# Patient Record
Sex: FEMALE | Race: WHITE | Employment: UNEMPLOYED | URBAN - METROPOLITAN AREA
[De-identification: names, ages, dates, MRNs, and addresses within clinical notes are randomized per-mention and may not be internally consistent; named-entity substitution may affect disease eponyms.]

---

## 2018-11-24 ENCOUNTER — HOSPITAL ENCOUNTER (OUTPATIENT)
Age: 23
Setting detail: SPECIMEN
Discharge: HOME | End: 2018-11-24
Payer: COMMERCIAL

## 2018-11-24 PROCEDURE — 87077 CULTURE AEROBIC IDENTIFY: CPT

## 2018-11-24 PROCEDURE — 87491 CHLMYD TRACH DNA AMP PROBE: CPT

## 2018-11-24 PROCEDURE — 87086 URINE CULTURE/COLONY COUNT: CPT

## 2018-11-24 PROCEDURE — 87661 TRICHOMONAS VAGINALIS AMPLIF: CPT

## 2018-11-24 PROCEDURE — 87591 N.GONORRHOEAE DNA AMP PROB: CPT

## 2018-11-24 PROCEDURE — 87186 SC STD MICRODIL/AGAR DIL: CPT

## 2018-11-28 LAB
CULTURE: NORMAL
Lab: NORMAL
ORGANISM: NORMAL
REPORT STATUS: NORMAL
SPECIMEN: NORMAL
TOTAL COLONY COUNT: NORMAL

## 2018-11-29 LAB
CHLAMYDIA TRACHOMATIS AMPLIFIED DET: NEGATIVE
N GONORRHOEAE AMPLIFIED DET: NEGATIVE
T. VAGINALIS BY TMA: NEGATIVE

## 2019-02-05 ENCOUNTER — OFFICE VISIT (OUTPATIENT)
Dept: INTERNAL MEDICINE CLINIC | Age: 24
End: 2019-02-05
Payer: COMMERCIAL

## 2019-02-05 VITALS
BODY MASS INDEX: 22.88 KG/M2 | DIASTOLIC BLOOD PRESSURE: 70 MMHG | HEART RATE: 100 BPM | HEIGHT: 67 IN | SYSTOLIC BLOOD PRESSURE: 100 MMHG | OXYGEN SATURATION: 98 % | RESPIRATION RATE: 20 BRPM | WEIGHT: 145.8 LBS

## 2019-02-05 DIAGNOSIS — Z20.2 POSSIBLE EXPOSURE TO STD: ICD-10-CM

## 2019-02-05 DIAGNOSIS — B86 SCABIES: ICD-10-CM

## 2019-02-05 DIAGNOSIS — Z76.89 ENCOUNTER TO ESTABLISH CARE: Primary | ICD-10-CM

## 2019-02-05 DIAGNOSIS — Z86.19 HX OF HEPATITIS C: ICD-10-CM

## 2019-02-05 DIAGNOSIS — Z11.4 ENCOUNTER FOR SCREENING FOR HIV: ICD-10-CM

## 2019-02-05 DIAGNOSIS — Z13.31 POSITIVE DEPRESSION SCREENING: ICD-10-CM

## 2019-02-05 DIAGNOSIS — F41.8 DEPRESSION WITH ANXIETY: ICD-10-CM

## 2019-02-05 PROCEDURE — G8484 FLU IMMUNIZE NO ADMIN: HCPCS | Performed by: NURSE PRACTITIONER

## 2019-02-05 PROCEDURE — G8427 DOCREV CUR MEDS BY ELIG CLIN: HCPCS | Performed by: NURSE PRACTITIONER

## 2019-02-05 PROCEDURE — G8431 POS CLIN DEPRES SCRN F/U DOC: HCPCS | Performed by: NURSE PRACTITIONER

## 2019-02-05 PROCEDURE — G8420 CALC BMI NORM PARAMETERS: HCPCS | Performed by: NURSE PRACTITIONER

## 2019-02-05 PROCEDURE — 96160 PT-FOCUSED HLTH RISK ASSMT: CPT | Performed by: NURSE PRACTITIONER

## 2019-02-05 PROCEDURE — 99204 OFFICE O/P NEW MOD 45 MIN: CPT | Performed by: NURSE PRACTITIONER

## 2019-02-05 PROCEDURE — 4004F PT TOBACCO SCREEN RCVD TLK: CPT | Performed by: NURSE PRACTITIONER

## 2019-02-05 RX ORDER — PERMETHRIN 50 MG/G
CREAM TOPICAL
Qty: 1 TUBE | Refills: 0 | Status: SHIPPED | OUTPATIENT
Start: 2019-02-05 | End: 2019-10-23

## 2019-02-05 RX ORDER — LEVONORGESTREL AND ETHINYL ESTRADIOL 0.1-0.02MG
KIT ORAL
Refills: 4 | COMMUNITY
Start: 2019-01-25 | End: 2019-10-23

## 2019-02-05 RX ORDER — PAROXETINE HYDROCHLORIDE 20 MG/1
20 TABLET, FILM COATED ORAL NIGHTLY
Qty: 30 TABLET | Refills: 0 | Status: SHIPPED | OUTPATIENT
Start: 2019-02-05 | End: 2019-02-05 | Stop reason: CLARIF

## 2019-02-05 RX ORDER — PAROXETINE HYDROCHLORIDE 20 MG/1
20 TABLET, FILM COATED ORAL DAILY
Qty: 30 TABLET | Refills: 2 | Status: SHIPPED | OUTPATIENT
Start: 2019-02-05 | End: 2019-10-23

## 2019-02-05 RX ORDER — BUSPIRONE HYDROCHLORIDE 5 MG/1
5 TABLET ORAL 2 TIMES DAILY
Qty: 60 TABLET | Refills: 0 | Status: CANCELLED | OUTPATIENT
Start: 2019-02-05 | End: 2019-03-07

## 2019-02-05 RX ORDER — CITALOPRAM 10 MG/1
10 TABLET ORAL DAILY
Qty: 30 TABLET | Refills: 3 | Status: CANCELLED | OUTPATIENT
Start: 2019-02-05

## 2019-02-05 ASSESSMENT — ENCOUNTER SYMPTOMS
SINUS PAIN: 0
COUGH: 0
ALLERGIC/IMMUNOLOGIC NEGATIVE: 1
NAUSEA: 0
COLOR CHANGE: 0
DIARRHEA: 0
SHORTNESS OF BREATH: 0
APNEA: 0
ABDOMINAL PAIN: 0
CHEST TIGHTNESS: 0
EYES NEGATIVE: 1
SINUS PRESSURE: 0
VOMITING: 0

## 2019-02-05 ASSESSMENT — PATIENT HEALTH QUESTIONNAIRE - PHQ9
7. TROUBLE CONCENTRATING ON THINGS, SUCH AS READING THE NEWSPAPER OR WATCHING TELEVISION: 2
10. IF YOU CHECKED OFF ANY PROBLEMS, HOW DIFFICULT HAVE THESE PROBLEMS MADE IT FOR YOU TO DO YOUR WORK, TAKE CARE OF THINGS AT HOME, OR GET ALONG WITH OTHER PEOPLE: 3
6. FEELING BAD ABOUT YOURSELF - OR THAT YOU ARE A FAILURE OR HAVE LET YOURSELF OR YOUR FAMILY DOWN: 0
9. THOUGHTS THAT YOU WOULD BE BETTER OFF DEAD, OR OF HURTING YOURSELF: 0
8. MOVING OR SPEAKING SO SLOWLY THAT OTHER PEOPLE COULD HAVE NOTICED. OR THE OPPOSITE, BEING SO FIGETY OR RESTLESS THAT YOU HAVE BEEN MOVING AROUND A LOT MORE THAN USUAL: 3
SUM OF ALL RESPONSES TO PHQ QUESTIONS 1-9: 16
SUM OF ALL RESPONSES TO PHQ QUESTIONS 1-9: 16
2. FEELING DOWN, DEPRESSED OR HOPELESS: 2
4. FEELING TIRED OR HAVING LITTLE ENERGY: 1
SUM OF ALL RESPONSES TO PHQ9 QUESTIONS 1 & 2: 4
3. TROUBLE FALLING OR STAYING ASLEEP: 3
1. LITTLE INTEREST OR PLEASURE IN DOING THINGS: 2
5. POOR APPETITE OR OVEREATING: 3

## 2019-05-10 ENCOUNTER — HOSPITAL ENCOUNTER (OUTPATIENT)
Dept: PSYCHIATRY | Age: 24
Setting detail: THERAPIES SERIES
Discharge: HOME | End: 2019-05-10
Payer: COMMERCIAL

## 2019-05-10 PROCEDURE — 80305 DRUG TEST PRSMV DIR OPT OBS: CPT

## 2019-05-10 PROCEDURE — 90791 PSYCH DIAGNOSTIC EVALUATION: CPT

## 2019-05-13 ENCOUNTER — APPOINTMENT (OUTPATIENT)
Dept: PSYCHIATRY | Age: 24
End: 2019-05-13
Payer: COMMERCIAL

## 2019-05-15 ENCOUNTER — APPOINTMENT (OUTPATIENT)
Dept: PSYCHIATRY | Age: 24
End: 2019-05-15
Payer: COMMERCIAL

## 2019-05-17 ENCOUNTER — APPOINTMENT (OUTPATIENT)
Dept: PSYCHIATRY | Age: 24
End: 2019-05-17
Payer: COMMERCIAL

## 2019-05-31 ENCOUNTER — APPOINTMENT (OUTPATIENT)
Dept: PSYCHIATRY | Age: 24
End: 2019-05-31
Payer: COMMERCIAL

## 2019-10-23 ENCOUNTER — APPOINTMENT (OUTPATIENT)
Dept: CT IMAGING | Age: 24
End: 2019-10-23
Payer: COMMERCIAL

## 2019-10-23 ENCOUNTER — HOSPITAL ENCOUNTER (EMERGENCY)
Age: 24
Discharge: HOME | End: 2019-10-23
Attending: EMERGENCY MEDICINE
Payer: COMMERCIAL

## 2019-10-23 VITALS
RESPIRATION RATE: 18 BRPM | DIASTOLIC BLOOD PRESSURE: 78 MMHG | BODY MASS INDEX: 28.25 KG/M2 | WEIGHT: 180 LBS | HEART RATE: 55 BPM | HEIGHT: 67 IN | OXYGEN SATURATION: 100 % | SYSTOLIC BLOOD PRESSURE: 111 MMHG | TEMPERATURE: 97.4 F

## 2019-10-23 DIAGNOSIS — R11.2 NON-INTRACTABLE VOMITING WITH NAUSEA, UNSPECIFIED VOMITING TYPE: ICD-10-CM

## 2019-10-23 DIAGNOSIS — G44.89 OTHER HEADACHE SYNDROME: Primary | ICD-10-CM

## 2019-10-23 PROCEDURE — 96375 TX/PRO/DX INJ NEW DRUG ADDON: CPT

## 2019-10-23 PROCEDURE — 2580000003 HC RX 258: Performed by: EMERGENCY MEDICINE

## 2019-10-23 PROCEDURE — 96365 THER/PROPH/DIAG IV INF INIT: CPT

## 2019-10-23 PROCEDURE — 99284 EMERGENCY DEPT VISIT MOD MDM: CPT

## 2019-10-23 PROCEDURE — 2500000003 HC RX 250 WO HCPCS: Performed by: EMERGENCY MEDICINE

## 2019-10-23 PROCEDURE — 6360000002 HC RX W HCPCS: Performed by: EMERGENCY MEDICINE

## 2019-10-23 PROCEDURE — 96361 HYDRATE IV INFUSION ADD-ON: CPT

## 2019-10-23 PROCEDURE — 6370000000 HC RX 637 (ALT 250 FOR IP): Performed by: EMERGENCY MEDICINE

## 2019-10-23 PROCEDURE — 70450 CT HEAD/BRAIN W/O DYE: CPT

## 2019-10-23 RX ORDER — KETOROLAC TROMETHAMINE 30 MG/ML
30 INJECTION, SOLUTION INTRAMUSCULAR; INTRAVENOUS ONCE
Status: COMPLETED | OUTPATIENT
Start: 2019-10-23 | End: 2019-10-23

## 2019-10-23 RX ORDER — ONDANSETRON 2 MG/ML
4 INJECTION INTRAMUSCULAR; INTRAVENOUS EVERY 30 MIN PRN
Status: DISCONTINUED | OUTPATIENT
Start: 2019-10-23 | End: 2019-10-23 | Stop reason: HOSPADM

## 2019-10-23 RX ORDER — KETOROLAC TROMETHAMINE 30 MG/ML
30 INJECTION, SOLUTION INTRAMUSCULAR; INTRAVENOUS ONCE
Status: DISCONTINUED | OUTPATIENT
Start: 2019-10-23 | End: 2019-10-23

## 2019-10-23 RX ORDER — 0.9 % SODIUM CHLORIDE 0.9 %
1000 INTRAVENOUS SOLUTION INTRAVENOUS ONCE
Status: COMPLETED | OUTPATIENT
Start: 2019-10-23 | End: 2019-10-23

## 2019-10-23 RX ORDER — DIPHENHYDRAMINE HCL 25 MG
25 TABLET ORAL ONCE
Status: COMPLETED | OUTPATIENT
Start: 2019-10-23 | End: 2019-10-23

## 2019-10-23 RX ORDER — PROCHLORPERAZINE EDISYLATE 5 MG/ML
10 INJECTION INTRAMUSCULAR; INTRAVENOUS ONCE
Status: COMPLETED | OUTPATIENT
Start: 2019-10-23 | End: 2019-10-23

## 2019-10-23 RX ADMIN — KETOROLAC TROMETHAMINE 30 MG: 30 INJECTION, SOLUTION INTRAMUSCULAR; INTRAVENOUS at 11:55

## 2019-10-23 RX ADMIN — SODIUM CHLORIDE 1000 ML: 9 INJECTION, SOLUTION INTRAVENOUS at 11:01

## 2019-10-23 RX ADMIN — ONDANSETRON 4 MG: 2 INJECTION INTRAMUSCULAR; INTRAVENOUS at 11:01

## 2019-10-23 RX ADMIN — PROCHLORPERAZINE EDISYLATE 10 MG: 5 INJECTION INTRAMUSCULAR; INTRAVENOUS at 11:01

## 2019-10-23 RX ADMIN — DIPHENHYDRAMINE HYDROCHLORIDE 25 MG: 25 TABLET ORAL at 11:01

## 2019-10-23 RX ADMIN — CAFFEINE AND SODIUM BENZOATE 500 MG: 125 INJECTION, SOLUTION INTRAMUSCULAR; INTRAVENOUS at 11:30

## 2019-10-23 ASSESSMENT — PAIN DESCRIPTION - LOCATION: LOCATION: HEAD

## 2019-10-23 ASSESSMENT — ENCOUNTER SYMPTOMS
SORE THROAT: 0
EYE REDNESS: 0
SHORTNESS OF BREATH: 0
VOMITING: 1
NAUSEA: 1
BACK PAIN: 0
RHINORRHEA: 0
CONSTIPATION: 0
DIARRHEA: 0
PHOTOPHOBIA: 1
COUGH: 0
ABDOMINAL PAIN: 0

## 2019-10-23 ASSESSMENT — PAIN DESCRIPTION - PAIN TYPE: TYPE: ACUTE PAIN

## 2019-10-23 ASSESSMENT — PAIN SCALES - GENERAL
PAINLEVEL_OUTOF10: 7
PAINLEVEL_OUTOF10: 7

## 2020-12-14 ENCOUNTER — HOSPITAL ENCOUNTER (OUTPATIENT)
Dept: PSYCHIATRY | Age: 25
Setting detail: THERAPIES SERIES
Discharge: HOME | End: 2020-12-14
Payer: COMMERCIAL

## 2020-12-14 PROCEDURE — 90791 PSYCH DIAGNOSTIC EVALUATION: CPT

## 2020-12-14 PROCEDURE — 80305 DRUG TEST PRSMV DIR OPT OBS: CPT

## 2020-12-14 ASSESSMENT — LIFESTYLE VARIABLES: HISTORY_ALCOHOL_USE: YES

## 2020-12-14 NOTE — PROGRESS NOTES
[address] [facility]        Individual  Progress Note    Location: [x] [place] [] Elisabeth cadet                   Patient Name: Johnnie Owen   : 1995     Case # :  [id number]  Therapist: Jorge Torrez        Objective/Service/Time: K 1 assessment    S-Client is a 31year old  female on probation with [address] [place] [address] after getting a drug TONY in 2020. Client report she was using cocaine but it was laced with Fentanyl and the police gave her Narcan. Client has been to multiple treatment centers both inpatient and outpatient. O-Client was cooperative, her thought process was logical, her mood euthymic at times depressed, her affect full and speech rapid. Client denies any hallucinations or delusions at this time. No HI/SI. A-Completed intake paperwork, began assessment and administered initial UDS. Client was placed in Level 2.1 IOP to begin 2020. She will attend Monday, Wednesday and Friday 9:00am to 12:00PM.     P-Client will start group ASAP on 2020 and will return on 20 at 8:00am to complete her assessment.          Electronically signed by Jorge Torrez on 2020 at 10:56 AM

## 2020-12-16 ENCOUNTER — HOSPITAL ENCOUNTER (OUTPATIENT)
Dept: PSYCHIATRY | Age: 25
Setting detail: THERAPIES SERIES
Discharge: HOME | End: 2020-12-16
Payer: COMMERCIAL

## 2020-12-16 NOTE — GROUP NOTE
[facility] Group Therapy Note      12/16/2020    Location:  [place]      Clients Presents: [phone]    Clients Absent: [phone]    Length of session: 3.0 hours    Group Note: IOP    Group Type: Co-Ed    New members were welcomed and introduced. Norms and expectations of group were discussed. Content: Checked-In  TOPIC:  \"Spirituality and Personality\" Part II  Clients learned to understand some of the ways in which personality change occurs and what they can do to make positive personality changes and grow spiritually.     [facility] 320  44/24/3775 7:50 PM          [employer] Individual Group Progress Note    Fallon Bermudez  1995 12/16/2020    Notes on Client Progress in Group    Reason for Absence: CX-Transportation fail through    [facility] 320  49/32/1442 9:94 PM    Co-Therapist: N/A

## 2020-12-18 ENCOUNTER — HOSPITAL ENCOUNTER (OUTPATIENT)
Dept: PSYCHIATRY | Age: 25
Setting detail: THERAPIES SERIES
Discharge: HOME | End: 2020-12-18
Payer: COMMERCIAL

## 2020-12-18 PROCEDURE — 90853 GROUP PSYCHOTHERAPY: CPT

## 2020-12-18 NOTE — GROUP NOTE
[facility] Group Therapy Note      12/18/2020    Location:  [facility] Presents: [id number] [id number] [phone]    Clients Absent:     Length of session: 3.0 hours    Group Note: IOP    Group Type: Co-Ed    New members were welcomed and introduced. Norms and expectations of group were discussed. Content: Group Checked-In  TOPIC:  \"Difficulties of the Holidays\"  Clients identified the following difficulties of the holidays:  Isolation, Triggers, Themselves, and Lack of Support. They verbalized strategies to handle difficulties that will support their own sobriety (i.e. don't be alone, identify triggers, ride out cravings, do something different, distract yourself, contact sponsor/support people, attend a meeting/Zoom/Face-Book, take a walk etc.). Tresa Alford [facility] 320  56/46/0120 6:75 PM          [employer] Individual Group Progress Note    Milena Laughter  1995 12/18/2020    Notes on Client Progress in Group    This was Jak's first IOP group. She stated, \"I feel content. \" \"I have defeated temptation consistently all week! \"  Participated in the group discussion and shared openly. Gave appropriate feedback.     Tresa Alford [facility] 320  20/03/4292 2:14 PM    Co-Therapist: N/A

## 2020-12-21 ENCOUNTER — APPOINTMENT (OUTPATIENT)
Dept: PSYCHIATRY | Age: 25
End: 2020-12-21
Payer: COMMERCIAL

## 2020-12-21 ENCOUNTER — HOSPITAL ENCOUNTER (OUTPATIENT)
Dept: PSYCHIATRY | Age: 25
Setting detail: THERAPIES SERIES
Discharge: HOME | End: 2020-12-21
Payer: COMMERCIAL

## 2020-12-21 PROCEDURE — 90853 GROUP PSYCHOTHERAPY: CPT

## 2020-12-21 PROCEDURE — 90834 PSYTX W PT 45 MINUTES: CPT

## 2020-12-21 NOTE — PROGRESS NOTES
[facility] TREATMENT PLAN      Location: [x] [place] [] Lesly Small    Treatment plan: Initial    Strengths: taking online college classes    Weakness/Limitations: tendency to isolate     Service/Frequency/Duration: IOP 3 days a week for 90 days, Urinalysis Random for 90 days, AA/NA 1-2 weekly for 90 days and Case Management as needed for 90 days    Diagnosis: F10.20 Other and unspecified alcohol dependence/unspecified drinking behavior, F11.20 Opioid dependence-unspecified use, F14.20 Cocaine dependence-unspecified use, F15.20 Amphetamine and other psychostimulant dependence-unspecified use and F12.10 Nondependent cannabis abuse-unspecified use    Level of Care: 2.1 Intensive Outpatient Services    1. Problem: Substance use   a. Goal: Client will remain abstinent from all mood altering substances in 90 days   b. Objectives:   i. 1) Client will acknowledge 8 old people, places, and things that are unhealthy to her recovery journey to share weekly during group sessions Evaluation Date: 3/21/2021 Code: C Continue TBD  ii. 2) Client will identify 10 problems caused by AoD use and led to consequences and be able to process with counselor weekly Evaluation Date: 3/21/2021 Code: C Continue TBD  3) Client will identify environments that are detrimental to her recovery and develop ways to avoid them during individual sessions one time each week with primary counselor Evaluation Date: 3/21/2021 Code: C Continue TBD    2. Problem: Legal involvement   a. Goal: Client will comply with court/probation stipulation requirements in 90 days. b. Objectives:   i. 1) Client will participate in updated progress reports for legal system. Evaluation Date: 3/21/2021 Code: C Continue TBD   ii.  2) Client will submit to any random UDS and pay on any court/probation fines   Evaluation Date: 3/21/2021 Code: C Continue TBD 3) Client will meet with  for linkage and referral to community resources as needed. Evaluation Date: 3/21/2021 Code: C Continue TBD     3. Problem: Relapse prevention   a. Goal: Client will learn new relapse prevention skills to avoid relapsing in the future in 90 days  b. Objectives:   i. 1)  Client will complete a relapse prevention workbook and develop a relapse prevention plan and process with his counselor once a week. Evaluation Date: 3/21/2021 Code: C Continue TBD   ii. 2)  Client will attend 1-2 NA/AA meetings weekly for building support. Evaluation Date: 3/21/2021 Code: C Continue TBD   iii 3) Client will obtain 3-5 phone numbers of sober support people and reach out weekly Evaluation Date: 3/21/2021 Code: C Continue TBD    Defer: Client would like to get 6 months clean and get her NA tag    Discharge Plan/Instructions: Client will complete her goals and objectives and maintain abstinence as evidence by UDS. Ana Cristina Gaspar / 1995 has participated in the treatment plan development outlined above on 12/21/2020.      Dmitriy Charles, [address]  12/21/2020/8:35 AM

## 2020-12-21 NOTE — PROGRESS NOTES
612 [facility]        Individual  Progress Note    Location: [x] [place] [] Elisabeth cadet                   Patient Name: Courtney Lino   : 1995     Case # :  [id number]  Therapist: Dennie Cotta        Objective/Service/Time: K 1 individual    S-Client is a 31year old  female on probation. Client reports she got an [address] in 2020 (drug) Client is on probation with [employer]. Client reports she uses cocaine. During her TONY she told officers she snorted 20 of cocaine but as soon as she did it she knew it was laced with Fentanyl and they gave her Narcan. O-Client was cooperative, pleasant and oriented x 4. A-Client and counselor reviewed last UDS dated 2020 that was negative for all substances. Completed assessment and created a treatment plan. Client placed in [facility] and has engaged in group.     P-Client will attend IOP Monday, Wednesday and Friday 9am-12pm.        Electronically signed by Dennie Cotta on 2020 at 9:04 AM

## 2020-12-21 NOTE — GROUP NOTE
[facility] Group Therapy Note      12/21/2020    Location:  [place]      Clients Presents: [phone] [phone]    Clients Absent: [phone]    Length of session: 3.0 hours    Group Note: IOP    Group Type: Co-Ed    New members were welcomed and introduced. Norms and expectations of group were discussed. Content: Group Completed Checked-In  TOPIC:  \"Yes, I'm Still Clean\"  Clients viewed  \"Dakotah Talley\" video who shared his personal story of addiction. Clients shared what they could relate to, what they took from [place] Kailyn' story, and how they plan to maintain their own sobriety. Tresa Alford [facility] 320  38/43/1315 9:66 PM        [employer] Individual Group Progress Note    Wagner Sampson  1995 12/21/2020    Notes on Client Progress in Group    Vernellraz Sky was attentive. She stated, \"I feel amazing! \" \"Internally it's different for me this time! \"  Participated in the viewing of the video. Shared what she could relate to and offered insight into her own struggle with substances. Gave appropriate feedback.       Tresa Alford [facility] 320  10/99/9540 3:47 PM    Co-Therapist: N/A

## 2020-12-23 ENCOUNTER — HOSPITAL ENCOUNTER (OUTPATIENT)
Dept: PSYCHIATRY | Age: 25
Setting detail: THERAPIES SERIES
Discharge: HOME | End: 2020-12-23
Payer: COMMERCIAL

## 2020-12-23 PROCEDURE — 90853 GROUP PSYCHOTHERAPY: CPT

## 2020-12-23 NOTE — PROGRESS NOTES
[facility]        Individual  Progress Note    Location: [x] [place] [] [place]                   Patient Name: Jason Blood   : 1995     Case # :  [id number]  Therapist: Madeleine Hendrickson        Objective/Service/Time:       CASE MANAGEMENT    S:  I spoke with client in office about her concerns with needing a refill on her medication at [facility]. Client signed a release. She explained that she has left a message, but has not received a return call. O:  Client was pleasant open to discussion. A:  I contacted Hunter Trent at [address] [facility]. I explained that client sees, Lou Laguna (psychiatrist, Roya Valencia (physician) and María Cruz (). Connor Cedeño contacted necessary [address]  staff to resolve the issue. Clients medication was sent to her pharmacy. P:  Client will continue to speak with Therapist or  about requests for assistance if needed.       JEROME Emanuel, [address], CTTS        Electronically signed by Madeleine Hendrickson on 2020 at 3:17 PM

## 2020-12-23 NOTE — GROUP NOTE
612 [facility] Group Therapy Note      12/23/2020    Location:  [employer]    Clients Presents: [phone], [address], [phone]    Clients Absent: [phone]    Length of session: 3.0 hours    Group Note: IOP    Group Type: Co-Ed    New members were welcomed and introduced. Norms and expectations of group were discussed. Content: Counselor facilitated client check in information. Counselor presented a solution focused discussion on relapse prevention. Client identified stressors he/she may face during early recovery. Client identified coping skills he/she could utilize to avoid relapse. Client offered peers feedback and support. Nancy Blanco, [employer] [facility]  12/23/2020 12:00 PM    Co-Therapist: N/A      [facility] Individual Group Progress Note    Milena Delatorre  1995 12/23/2020    Notes on Client Progress in Group    Client shared her check in information reporting she felt \"optimistic and excited\". She shared she is still reaching out to people she should not be. She is excited for her father to be coming in town. He has not seen her clean in a while. Client denies nay use but shared she has cravings all the time. She is utilizing her sober support and using her coping skills. She has many admitted challenges with old people and staying off social media. Client participated in group discussion by sharing she is going to enjoy [place] with family because she was able to get gifts this year and shared she felt good about it.       Nancy Blanco, [employer] [facility]  12/23/2020 12:43 PM    Co-Therapist: N/A

## 2020-12-28 ENCOUNTER — APPOINTMENT (OUTPATIENT)
Dept: PSYCHIATRY | Age: 25
End: 2020-12-28
Payer: COMMERCIAL

## 2020-12-30 ENCOUNTER — HOSPITAL ENCOUNTER (OUTPATIENT)
Dept: PSYCHIATRY | Age: 25
Setting detail: THERAPIES SERIES
Discharge: HOME | End: 2020-12-30
Payer: COMMERCIAL

## 2020-12-30 PROCEDURE — 80305 DRUG TEST PRSMV DIR OPT OBS: CPT

## 2020-12-30 PROCEDURE — 90853 GROUP PSYCHOTHERAPY: CPT

## 2020-12-30 NOTE — GROUP NOTE
612  Group Therapy Note      12/30/2020    Location:  [employer]    Clients Presents: [phone]    Clients Absent: 9905, 9917    Length of session: 1.5 hours    Group Note: OP    Group Type: Co-Ed    New members were welcomed and introduced. Norms and expectations of group were discussed. Content: Counselor facilitated Client check in information. Counselor presented a topic focused discussion on having fun in sobriety. Client identified different ways to have fun in recovery. Client identified areas of danger when it comes to leisure interest and coping skills to avoid relapse. Client offered peers feedback and support. Rebekah Bermudez, [address]  12/30/2020 12:00 PM    Co-Therapist: N/A      [facility] Individual Group Progress Note    Judd Jo  1995 12/30/2020    Notes on Client Progress in Group    Client shared her check in information reporting no use or cravings. She shared about her Lynn with her father and brother. She reports overcoming a stressful dinner with family and did not use. She did hang out with a judie she knows is probably not good for her. Client is working part time and is working on being compliant with her mental health medication. Counselor observed this client being very scattered. Client reports she went 5 days without her MH medication. Client participated in group discussion and shared she knows how to have fun but not sober. Client is learning how to not act on impulse and think things through. Client reports her impulsivity has gotten her into trouble in the past. She did have some success over the holiday with not acting out in anger with her Uncle.      Rebekah Bermudez, [address]  12/30/2020 12:51 PM    Co-Therapist: N/A

## 2021-01-04 ENCOUNTER — APPOINTMENT (OUTPATIENT)
Dept: PSYCHIATRY | Age: 26
End: 2021-01-04
Payer: COMMERCIAL

## 2021-01-04 ENCOUNTER — HOSPITAL ENCOUNTER (OUTPATIENT)
Dept: PSYCHIATRY | Age: 26
Setting detail: THERAPIES SERIES
Discharge: HOME | End: 2021-01-04
Payer: COMMERCIAL

## 2021-01-04 PROCEDURE — 80305 DRUG TEST PRSMV DIR OPT OBS: CPT

## 2021-01-04 PROCEDURE — 90853 GROUP PSYCHOTHERAPY: CPT

## 2021-01-04 NOTE — GROUP NOTE
[facility] Group Therapy Note      1/4/2021    Location:  [place]      Clients Presents: [phone]    Clients Absent: [phone] [phone]    Length of session: 3.0 hours    Group Note: IOP    Group Type: Co-Ed    New members were welcomed and introduced. Norms and expectations of group were discussed. Content: Group Checked-In  TOPIC:  \"Assessment tool In Measuring client's motivation to Change\" - \"Worksheet Identifying External/Internal Triggers and Management Skills to [employer]"  Clients evaluated their motivation to change using scaling questions. Clients identified externa/internal triggers and strategies to management them that promote their sobriety. Godfrey Alford  9/9/8260 2:42 PM        [employer] Individual Group Progress Note    Sameera Mora  1995 1/4/2021    Notes on Client Progress in Group    Cayden Jean was attentive. She stated, \"I accomplished! \" \"Passed my class! \" \"I had a close call yesterday. \" After sharing about her close call. she was confronted about her thinking and behavior and her part in the situation and became more agitated as the group session continued. Toward the end of the group she owned up to her behavior outside of the group and during the group. Participated in the group discussion, shared openly, and offered insight into what she wants to change, steps to take to change, along with identifying her own triggers and strategies to handle them so that relapse doesn't occur. Gave appropriate feedback.       Godfrey Alford  5/8/1395 4:43 PM    Co-Therapist: N/A

## 2021-01-06 ENCOUNTER — HOSPITAL ENCOUNTER (OUTPATIENT)
Dept: PSYCHIATRY | Age: 26
Setting detail: THERAPIES SERIES
Discharge: HOME | End: 2021-01-06
Payer: COMMERCIAL

## 2021-01-06 PROCEDURE — 90853 GROUP PSYCHOTHERAPY: CPT

## 2021-01-07 ENCOUNTER — HOSPITAL ENCOUNTER (OUTPATIENT)
Dept: PSYCHIATRY | Age: 26
Setting detail: THERAPIES SERIES
Discharge: HOME | End: 2021-01-07
Payer: COMMERCIAL

## 2021-01-07 PROCEDURE — 90834 PSYTX W PT 45 MINUTES: CPT

## 2021-01-07 NOTE — PROGRESS NOTES
[facility]                Progress Note    [x] [place]  [] Elisabeth cadet                    Patient Name: Ivelisse Beauleiu   : 1995     Case # :  [id number]  Therapist: Edy Arceo        Objective/Service/Time:    K.1    S:  Client completed with first individual session with this therapist. She stated, \"I drank 3 glasses of wine yesterday but I didn't use any other hard drugs. \" She was able to process her last lapse and will working on a Personal Relapse Prevention Plan. O:  Client was talkative and cooperative. A:  Client continues in IOP. She has significant mental health concerns, sees a counselor and takes various medications. She is in the action stage of change but continues to struggle with staying clean and sober. She has completed     P:  Continue with treatment.                   Javier Garcia MA, [facility], , 46:04 AM

## 2021-01-08 ENCOUNTER — HOSPITAL ENCOUNTER (OUTPATIENT)
Dept: PSYCHIATRY | Age: 26
Setting detail: THERAPIES SERIES
Discharge: HOME | End: 2021-01-08
Payer: COMMERCIAL

## 2021-01-11 ENCOUNTER — HOSPITAL ENCOUNTER (OUTPATIENT)
Dept: PSYCHIATRY | Age: 26
Setting detail: THERAPIES SERIES
Discharge: HOME | End: 2021-01-11
Payer: COMMERCIAL

## 2021-01-11 ENCOUNTER — APPOINTMENT (OUTPATIENT)
Dept: PSYCHIATRY | Age: 26
End: 2021-01-11
Payer: COMMERCIAL

## 2021-01-11 NOTE — GROUP NOTE
[facility] Group Therapy Note      1/11/2021    Location:  [place]      Clients Presents: [phone]    Clients Absent: 9915    Length of session: 3.0 hours    Group Note: IOP    Group Type: Co-Ed    New members were welcomed and introduced. Norms and expectations of group were discussed. Content: Group Checked-In  TOPIC:  \"Motivation in Recovery\" - \"10 Ways To Find New Motivation and Rise Above Roadblocks\"  Clients watched DVD and listened to how other recovering people stayed motivated in their recovery. They also read and learned about:  \"Motivation Roadblocks in Recovery and How to Get and Stay Motivated in Addiction Recovery. Godfrey Alford  5/19/7021 33:17 PM        [facility] Individual Group Progress Note    Nellie Garcia  1995 1/11/2021    Notes on Client Progress in Group    Reason for Absence: CX-Client reports having a swollen face due to a toothache and will bring documentation at her next session.     Godfrey Alford  7/29/4925 11:48 PM    Co-Therapist: N/A

## 2021-01-13 ENCOUNTER — APPOINTMENT (OUTPATIENT)
Dept: PSYCHIATRY | Age: 26
End: 2021-01-13
Payer: COMMERCIAL

## 2021-01-13 ENCOUNTER — HOSPITAL ENCOUNTER (OUTPATIENT)
Dept: PSYCHIATRY | Age: 26
Setting detail: THERAPIES SERIES
Discharge: HOME | End: 2021-01-13
Payer: COMMERCIAL

## 2021-01-13 PROCEDURE — 90853 GROUP PSYCHOTHERAPY: CPT

## 2021-01-13 NOTE — GROUP NOTE
[facility] Group Therapy Note      1/13/2021    Location:  [place]      Clients Presents: [phone]    Clients Absent: 0    Length of session: 3.0 hours    Group Note: IOP    Group Type: Co-Ed    New members were welcomed and introduced. Norms and expectations of group were discussed. Content: Group Checked-In  TOPIC:  \"Adverse Childhood Experiences (ACE) Assessment and Discussion\"   Clients examined the relationship between ACE's to substance use and related behavioral health problems such as:  Early initiation of alcohol use, Higher risk of mental and substance use disorders as an older adult (50+ years), Continued tobacco use during adulthood, Prescription drug use, and Lifetime of illicit drug use. Clients discussed their scores, the ramifications of ACE in their own lives and the lives of their own children. Tresa Alford [facility] 320  7/33/0170 4:56 PM          [employer] Individual Group Progress Note    Stan Hidalgo  1995 1/13/2021    Notes on Client Progress in Group    Scott Wolf was attentive. She stated, \"I feel exhausted. \" \"I fell for it again! \" She went into details about an unhealthy relationship that she is in, her part in it, and what she is willing to do differently. Participated in the group discussion, completed ACE assessment, and shared openly about her concerns about her own child being exposed to experiences and how that made her feel. She processed her feelings and received mutual support from other group members. Other protective factors and strategies were discussed to reduce the transmission of ACE's. Gave appropriate feedback.        Tresa Alford [facility] 320  6/99/5694 8:14 PM    Co-Therapist: N/A

## 2021-01-15 ENCOUNTER — HOSPITAL ENCOUNTER (OUTPATIENT)
Dept: PSYCHIATRY | Age: 26
Setting detail: THERAPIES SERIES
Discharge: HOME | End: 2021-01-15
Payer: COMMERCIAL

## 2021-01-15 PROCEDURE — H2020 THER BEHAV SVC, PER DIEM: HCPCS

## 2021-01-15 NOTE — GROUP NOTE
[facility] Group Therapy Note      1/15/2021    Location:  [place]      Clients Presents: [phone]    Clients Absent: 7223    Length of session: 3.0 hours    Group Note: [facility]    Group Type: Co-Ed    New members were welcomed and introduced. Norms and expectations of group were discussed. Content: Group Checked-In  TOPIC:  \"Coping With the Possibility of a Relapse\" - \"Managing Anger\"  Clients explored feelings and needs of another with respect to relapse and learned strategies for coping with relapse. Clients played the Empressr" where they learned how anger could be a trigger. They discussed ways to recognize anger, how to address buildup anger, and strategies to handle anger that support their own recovery. Tresa Matta [facility] 320  6/03/8192 15:74 PM          [facility] Progress [facility]    Bull Soriano  1995  1/15/2021    Notes on Client Progress in Group    Anthony Isbell was all over the place today in group with her feelings. She stated, \"I use  F- - - -! \"I don't know what's wrong with me! \" She was able to process her relapse, received encouragement from other group members, and made a plan for the weekend to stay sober. Client was open to insight other group members gave and stated, \"I don't want to go to [facility] ! \" Client has an individual session next Tuesday to talk about a HLOC. She identified ways she had handled anger in the past, and other strategies in channeling her anger today. Gave appropriate feedback.       Tresa Matta [facility] 320  4/33/6190 8:86 PM    Co-Therapist: N/A

## 2021-01-18 ENCOUNTER — HOSPITAL ENCOUNTER (OUTPATIENT)
Dept: PSYCHIATRY | Age: 26
Setting detail: THERAPIES SERIES
Discharge: HOME | End: 2021-01-18
Payer: COMMERCIAL

## 2021-01-18 ENCOUNTER — APPOINTMENT (OUTPATIENT)
Dept: PSYCHIATRY | Age: 26
End: 2021-01-18
Payer: COMMERCIAL

## 2021-01-18 PROCEDURE — H2020 THER BEHAV SVC, PER DIEM: HCPCS

## 2021-01-18 NOTE — GROUP NOTE
[facility] Group Therapy Note      1/18/2021    Location:  [place]      Clients Presents: [phone]    Clients Absent: 9925    Length of session: 3.0 hours    Group Note: IOP    Group Type: Co-Ed    New members were welcomed and introduced. Norms and expectations of group were discussed. Content: Group Checked-In  TOPIC:  \"7 Signs You're Self-Sabotaging Your Addiction Recovery\" - \"How To Banish Self Destructive Behaviors\" - \"How to Recognize Self-defeating Behavior\"  Clients learned how patterns of self-defeating behaviors can be damaging not only to the person, but also to others around them. Clients completed a treatment packet where they identified their own self-defeating patterns and strategies in avoiding them. [place], [place]  7/23/9460 8:65 PM          [facility] [employer] Individual Group Progress Note    Stan Hidalgo  1995 1/18/2021    Notes on Client Progress in Group    Scott Wolf was early and complained about getting a ride. She stated, \"I feel accomplished! \" \"I didn't leave or use over the weekend! \"  Participated in the group discussion, shared openly, completed treatment worksheet, and offered insight into her own self-defeating behaviors. Gave appropriated feedback.      [place], [place]  8/15/4438 9:31 PM    Co-Therapist: N/A

## 2021-01-19 ENCOUNTER — HOSPITAL ENCOUNTER (OUTPATIENT)
Dept: PSYCHIATRY | Age: 26
Setting detail: THERAPIES SERIES
Discharge: HOME | End: 2021-01-19
Payer: COMMERCIAL

## 2021-01-19 PROCEDURE — 90834 PSYTX W PT 45 MINUTES: CPT

## 2021-01-19 NOTE — PROGRESS NOTES
[facility]                Progress Note    [x] [place]  [] Kayley Varner                    Patient Name: Darleen Hopkins   : 1995     Case # :  [id number]  Therapist: Raegan Arceo        Objective/Service/Time:    K.1    S:  Client attended individual session. She reports no use of any substances. She stated, \"I''ve been staying with my mother and not dealing with the the toxic relationship I have with [facility] Setting! \" \"My life with my son means more to me that 160 days being apart from him! \"    O:  Client was talkative and cooperative. A:  Client is in the action stage of change in her recovery. She continues working toward completing objectives on her treatment plan. She has gained more insight into how to maintain her sobriety through practicing new coping skills. She will continue with individual sessions on  at 10:00am to work on self-esteem and compulsivity. P:  Continue with treatment.                   Garret Gamez MA, [facility], , 32:85 AM

## 2021-01-20 ENCOUNTER — HOSPITAL ENCOUNTER (OUTPATIENT)
Dept: PSYCHIATRY | Age: 26
Setting detail: THERAPIES SERIES
Discharge: HOME | End: 2021-01-20
Payer: COMMERCIAL

## 2021-01-20 ENCOUNTER — APPOINTMENT (OUTPATIENT)
Dept: PSYCHIATRY | Age: 26
End: 2021-01-20
Payer: COMMERCIAL

## 2021-01-20 PROCEDURE — H2020 THER BEHAV SVC, PER DIEM: HCPCS

## 2021-01-20 NOTE — GROUP NOTE
[facility] Group Therapy Note      1/20/2021    Location:  [place]      Clients Presents: [phone]    Clients Absent: [phone]    Length of session: 3.0 hours    Group Note: IOP    Group Type: Co-Ed    New members were welcomed and introduced. Norms and expectations of group were discussed. Content: Group Checked-In  TOPIC:  \"Understanding Drug Use and Addiction Drug Facts\"  Clients viewed and listened to [employer]  on [employer], discussed its content and what they could relate to. Clients read and discussed hand-out where they reviewed the definition of addiction, how drugs effect the brain's \"reward circuit,\" and drug facts. Godfrey Alford  4/90/2758 32:03 PM          [facility] [employer] Individual Group Progress Note    Sameera Mora  1995 1/20/2021    Notes on Client Progress in Group    Nicholasadrianna Jean was attentive. She stated, \"I feel [place]! \" \"I am not playing no games today-no matter what! \"  Participated in the group discussion, shared openly, and offered insight into her own understanding of the disease of addiction. Gave appropriate feedback.           Godfrey Alford  2/04/6433 36:69 PM    Co-Therapist: N/A

## 2021-01-22 ENCOUNTER — HOSPITAL ENCOUNTER (OUTPATIENT)
Dept: PSYCHIATRY | Age: 26
Setting detail: THERAPIES SERIES
Discharge: HOME | End: 2021-01-22
Payer: COMMERCIAL

## 2021-01-22 PROCEDURE — H2020 THER BEHAV SVC, PER DIEM: HCPCS

## 2021-01-22 NOTE — GROUP NOTE
[facility] Group [facility] Note      1/22/2021    Location:  [place]      Clients Presents: [phone]    Clients Absent: [phone]    Length of session: 3.0 hours    Group Note: [facility]    Group Type: Co-Ed    New members were welcomed and introduced. Norms and expectations of group were discussed. Content: Group Checked-In  TOPIC:  \"Coping Skills Hand-Out\" - Acronym (ACCEPTS) Worksheet  Clients read and discussed hand-out on coping skills where they identified skills they can practice to prevent relapse:  (i.e.Having Social Support, Diversions, Building New Habits, Avoiding Triggers/Risky Situations, Living A Healthy Lifestyle, and Managing Emotions) by practicing Relaxation techniques, Journaling, and Imagery. Clients completed a worksheet where they learned about the acronym ACCEPTS which stands for:  Activities, Contributing, Comparisons, Emotions, Pushing Away, Thoughts, and Sensations. This exercise helped clients to remember seven techniques for distracting themselves from distressing emotions until they pass. Godfrey Matta  3/11/0830 5:22 PM          [facility] Individual Group Progress Note    Bull Soriano  1995 1/22/2021    Notes on Client Progress in Group    Jak had her head down and was up and down in the first part of the group. She stated, \"I feel alive! \" \"Another Day! \" She met with the [facility]  and stated, I appreciate all the information. Participated in the group discussion, shared openly, and offered insight into her coping skills that she has used in the past that has helped her stay clean. Completed worksheet and gave appropriated feedback.        Godfrey Matta  4/07/1865 5:80 PM    Co-Therapist: N/A

## 2021-01-22 NOTE — PROGRESS NOTES
[facility]        Individual  Progress Note    Location: [x] [place] [] Elisabeth cadet                   Patient Name: Stan Hidalgo   : 1995     Case # : [id number]  Therapist: Reno Louie        Objective/Service/Time:     CASE MANAGEMENT    S:  I spoke with client in office about the case management referral that I received. O:  Client was oriented and open to discussion. A:  Client was provided several resources:  [facility] for employment and HEAP, [employer], AA/NA meeting list, food pantry and clothing resources, information to apply for [place] birth certificate for herself and [facility] information for birth certificate for her son. I also provided her with information for [address] [place] [address] for financial assistance along with [facility] for  and employment programs and [facility] for payment plan for fines. We discussed the [facility] quick reference quide for housing, clothing, utility assistance and mental health resources. P:  Client will make contact with resources and continue to speak with Therapist or  about requests for other assistance as needed.       JEROME Duong, [address], CTTS      Electronically signed by Reno Louie on 2021 at 11:08 AM

## 2021-01-25 ENCOUNTER — APPOINTMENT (OUTPATIENT)
Dept: PSYCHIATRY | Age: 26
End: 2021-01-25
Payer: COMMERCIAL

## 2021-01-27 ENCOUNTER — APPOINTMENT (OUTPATIENT)
Dept: PSYCHIATRY | Age: 26
End: 2021-01-27
Payer: COMMERCIAL

## 2021-01-27 ENCOUNTER — HOSPITAL ENCOUNTER (OUTPATIENT)
Dept: PSYCHIATRY | Age: 26
Setting detail: THERAPIES SERIES
Discharge: HOME | End: 2021-01-27
Payer: COMMERCIAL

## 2021-01-27 NOTE — GROUP NOTE
[facility] Note      1/27/2021    Location:  [place]      Clients Presents: [phone]    Clients Absent: [phone]    Length of session: 3.0 hours    Group Note: [facility]    Group Type: Co-Ed    New members were welcomed and introduced. Norms and expectations of group were discussed. Content: Group Check-In  TOPIC: \"Introducing 12-Step or [place]-Help Activities\" - \"Overview of each step in the 12-Step Program\"  Clients learned about the format, benefits, and challenges of 12-Step programs and about 12-Step meetings and other mutual-help groups.  Clients completed a worksheet where they identified the meaning of each step in the 12-Step Program.    Godfrey Conroy  8/02/8226 9:37 PM          [facility] Progress Note    Ashanti Giordano  1995 1/27/2021    Notes on Client Progress in Group    Reason for Absence: CX-May need a HLOC     Godfrey Conroy  7/99/5392 9:50 PM    Co-Therapist: N/A

## 2021-01-28 ENCOUNTER — HOSPITAL ENCOUNTER (OUTPATIENT)
Dept: PSYCHIATRY | Age: 26
Setting detail: THERAPIES SERIES
Discharge: HOME | End: 2021-01-28
Payer: COMMERCIAL

## 2021-01-28 PROCEDURE — 90834 PSYTX W PT 45 MINUTES: CPT

## 2021-01-29 ENCOUNTER — HOSPITAL ENCOUNTER (OUTPATIENT)
Dept: PSYCHIATRY | Age: 26
Setting detail: THERAPIES SERIES
Discharge: HOME | End: 2021-01-29
Payer: COMMERCIAL

## 2021-01-29 NOTE — GROUP NOTE
[facility] Note      1/29/2021    Location:  [place]      Clients Presents: [phone]    Clients Absent: [phone]    Length of session: 1.5 hours    Group Note: [facility]    Group Type: Co-Ed    New members were welcomed and introduced. Norms and expectations of group were discussed. Content: Group Checked-In  TOPIC:  \"Language For Emotion\"  Clients identified, defined, and discussed words that affect their mood . They learned how to manage their emotions in healthier ways.     Godfrey Alford  7/65/8724 58:61 AM          [facility] Progress Note    Silviano Early  1995 1/29/2021    Notes on Client Progress in Group    Reason for Absence: CX-No sitter     Godfrey Alford  7/34/2078 87:29 PM    Co-Therapist: N/A

## 2021-01-29 NOTE — PROGRESS NOTES
[facility]                Progress Note    [x] [place]  [] Elisabeth cadet                    Patient Name: Arrie Simmonds   : 1995     Case # :  [id number]  Therapist: Yary Arceo        Objective/Service/Time:  CASE MANAGEMENT    This therapist called Claudette Dc and gave a verbal update on said client. A Progress report will be faxed and emailed. Client may need a HLOC-Residential. [facility] [place] [address] may be a good program for client. [email or web address] [address] [place] [address], [facility], , 4:87 PM

## 2021-03-01 ENCOUNTER — APPOINTMENT (OUTPATIENT)
Dept: PSYCHIATRY | Age: 26
End: 2021-03-01
Payer: COMMERCIAL

## 2021-03-08 ENCOUNTER — APPOINTMENT (OUTPATIENT)
Dept: PSYCHIATRY | Age: 26
End: 2021-03-08
Payer: COMMERCIAL

## 2021-03-29 ENCOUNTER — HOSPITAL ENCOUNTER (OUTPATIENT)
Age: 26
Setting detail: SPECIMEN
Discharge: HOME | End: 2021-03-29
Payer: COMMERCIAL

## 2021-03-29 PROCEDURE — U0003 INFECTIOUS AGENT DETECTION BY NUCLEIC ACID (DNA OR RNA); SEVERE ACUTE RESPIRATORY SYNDROME CORONAVIRUS 2 (SARS-COV-2) (CORONAVIRUS DISEASE [COVID-19]), AMPLIFIED PROBE TECHNIQUE, MAKING USE OF HIGH THROUGHPUT TECHNOLOGIES AS DESCRIBED BY CMS-2020-01-R: HCPCS

## 2021-03-29 PROCEDURE — U0005 INFEC AGEN DETEC AMPLI PROBE: HCPCS

## 2021-03-30 LAB
SARS-COV-2: NOT DETECTED
SOURCE: NORMAL

## 2022-02-17 ENCOUNTER — OFFICE VISIT (OUTPATIENT)
Dept: FAMILY MEDICINE CLINIC | Age: 27
End: 2022-02-17
Payer: COMMERCIAL

## 2022-02-17 VITALS
HEIGHT: 67 IN | WEIGHT: 170 LBS | DIASTOLIC BLOOD PRESSURE: 64 MMHG | TEMPERATURE: 99.1 F | SYSTOLIC BLOOD PRESSURE: 114 MMHG | BODY MASS INDEX: 26.68 KG/M2 | HEART RATE: 102 BPM | OXYGEN SATURATION: 97 %

## 2022-02-17 DIAGNOSIS — M79.645 PAIN OF FINGER OF LEFT HAND: ICD-10-CM

## 2022-02-17 DIAGNOSIS — M79.89 SWELLING OF MIDDLE FINGER: Primary | ICD-10-CM

## 2022-02-17 PROCEDURE — G8419 CALC BMI OUT NRM PARAM NOF/U: HCPCS | Performed by: PHYSICIAN ASSISTANT

## 2022-02-17 PROCEDURE — G8484 FLU IMMUNIZE NO ADMIN: HCPCS | Performed by: PHYSICIAN ASSISTANT

## 2022-02-17 PROCEDURE — G8427 DOCREV CUR MEDS BY ELIG CLIN: HCPCS | Performed by: PHYSICIAN ASSISTANT

## 2022-02-17 PROCEDURE — 4004F PT TOBACCO SCREEN RCVD TLK: CPT | Performed by: PHYSICIAN ASSISTANT

## 2022-02-17 PROCEDURE — 99213 OFFICE O/P EST LOW 20 MIN: CPT | Performed by: PHYSICIAN ASSISTANT

## 2022-02-17 RX ORDER — QUETIAPINE FUMARATE 100 MG/1
100 TABLET, FILM COATED ORAL NIGHTLY
COMMUNITY

## 2022-02-17 RX ORDER — DULOXETIN HYDROCHLORIDE 60 MG/1
60 CAPSULE, DELAYED RELEASE ORAL DAILY
COMMUNITY

## 2022-02-17 RX ORDER — BUSPIRONE HYDROCHLORIDE 10 MG/1
10 TABLET ORAL 2 TIMES DAILY
COMMUNITY

## 2022-02-17 NOTE — PROGRESS NOTES
2/17/2022    [facility]    Chief Complaint   Patient presents with    Other     Swollen Middle finder L hand       HPI  History was obtained from patient and her significant other. Benito Sumner is a 31 y.o. female who presents today with complaints of redness, swelling, warmth, and pain of left middle finger for 2 to 3 days. She is not sure if she injured herself. Her significant other does recall her falling in the shower. She did not report immediate finger pain, however, after the fall. She admits drainage from the cuticle. She admits decreased range of motion. She admits loss of sensation. She denies nausea, vomiting, fever or chills. PAST MEDICAL HISTORY  Past Medical History:   Diagnosis Date    Anxiety     Drug abuse ([facility] [address] [facility]     Hepatitis C        FAMILY HISTORY  Family History   Problem Relation Age of Onset    Diabetes Father        SOCIAL HISTORY  Social History     Socioeconomic History    Marital status: Single     Spouse name: None    Number of children: None    Years of education: None    Highest education level: None   Occupational History    None   Tobacco Use    Smoking status: Current Every Day Smoker     Packs/day: 0.25     Years: 4.00     Pack years: 1.00     Types: Cigarettes    Smokeless tobacco: Never Used   Vaping Use    Vaping Use: Unknown   Substance and Sexual Activity    Alcohol use: Yes     Comment: rarely- once per month    Drug use: Yes     Types: Marijuana Devon Farnsworth     Comment: narcotics    Sexual activity: Yes     Partners: Female, Male   Other Topics Concern    None   Social History Narrative    None     Social Determinants of Health     Financial Resource Strain:     Difficulty of Paying Living Expenses: Not on file   Food Insecurity:     Worried About Running Out of Food in the Last Year: Not on file    Darryl of Food in the Last Year: Not on file   Transportation Needs:     Lack of Transportation (Medical):  Not on file    Lack of Transportation (Non-Medical): Not on file   Physical Activity:     Days of Exercise per Week: Not on file    Minutes of Exercise per Session: Not on file   Stress:     Feeling of Stress : Not on file   Social Connections:     Frequency of Communication with Friends and Family: Not on file    Frequency of Social Gatherings with Friends and Family: Not on file    Attends [employer] Services: Not on file    Active Member of [address] [place] [address] or Organizations: Not on file    Attends Club or Organization Meetings: Not on file    Marital Status: Not on file   Intimate Partner Violence:     Fear of Current or Ex-Partner: Not on file    Emotionally Abused: Not on file    Physically Abused: Not on file    Sexually Abused: Not on file   Housing Stability:     Unable to Pay for Housing in the Last Year: Not on file    Number of [place] in the Last Year: Not on file    Unstable Housing in the Last Year: Not on file        SURGICAL HISTORY  Past Surgical History:   Procedure Laterality Date    ADENOIDECTOMY      INDUCED   2010    TONSILLECTOMY         CURRENT MEDICATIONS  Current Outpatient Medications   Medication Sig Dispense Refill    busPIRone (BUSPAR) 10 MG tablet Take 10 mg by mouth 2 times daily      QUEtiapine (SEROQUEL) 100 MG tablet Take 100 mg by mouth at bedtime      DULoxetine (CYMBALTA) 60 MG extended release capsule Take 60 mg by mouth daily      ibuprofen (ADVIL;MOTRIN) 600 MG tablet Take 1 tablet by mouth every 6 hours as needed for Pain 30 tablet 0     No current facility-administered medications for this visit.        ALLERGIES  No Known Allergies    PHYSICAL EXAM    /64   Pulse 102   Temp 99.1 °F (37.3 °C) (Temporal)   Ht 5' 7\" (1.702 m)   Wt 170 lb (77.1 kg)   SpO2 97%   BMI 26.63 kg/m²     Constitutional:  Well developed, well nourished  HENT:  Normocephalic, atraumatic  Eyes:  conjunctiva normal, no discharge, no scleral icterus  Cardiovascular:  Normal heart rate, normal rhythm, no murmurs, gallops or rubs  Thorax & Lungs:  Normal breath sounds, no respiratory distress, no wheezing  Skin: See musculoskeletal exam.  Musculoskeletal: Very limited flexion of left third IP joints. Significant erythema, edema of the entire left third digit. Exquisite tenderness to palpation of the entire digit. Dried discharge noted along the nailbed. No lacerations. Neurologic:  Alert & oriented   Psychiatric:  Affect normal, mood normal    ASSESSMENT & PLAN    Hiro Mckinney was seen today for other. Diagnoses and all orders for this visit:    Swelling of middle finger    Pain of finger of left hand       Advised patient to seek care at local emergency department. I believe she may need consult from hand specialist ASAP. Patient voiced understanding and is agreeable. She understands the risks of delaying care. There are no discontinued medications. No follow-ups on file. Plan of care reviewed with patient who verbalizes understanding and wishes to continue. Patient verbalizes understanding with the above plan and is in agreement. Patient will call with  worsening of symptoms, questions or concerns. Please note that this chart was generated using dragon dictation software. Although every effort was made to ensure the accuracy of this automated transcription, some errors in transcription may have occurred.     Electronically signed by Zuly Taylor PA-C on 2/17/2022

## 2022-02-19 ENCOUNTER — APPOINTMENT (OUTPATIENT)
Dept: GENERAL RADIOLOGY | Age: 27
End: 2022-02-19
Payer: COMMERCIAL

## 2022-02-19 ENCOUNTER — HOSPITAL ENCOUNTER (EMERGENCY)
Age: 27
Discharge: LEFT BEFORE DISCHARGE | End: 2022-02-19
Attending: EMERGENCY MEDICINE
Payer: COMMERCIAL

## 2022-02-19 VITALS
OXYGEN SATURATION: 100 % | HEART RATE: 132 BPM | RESPIRATION RATE: 24 BRPM | TEMPERATURE: 98 F | DIASTOLIC BLOOD PRESSURE: 94 MMHG | WEIGHT: 165 LBS | HEIGHT: 67 IN | SYSTOLIC BLOOD PRESSURE: 147 MMHG | BODY MASS INDEX: 25.9 KG/M2

## 2022-02-19 DIAGNOSIS — S62.663A CLOSED NONDISPLACED FRACTURE OF DISTAL PHALANX OF LEFT MIDDLE FINGER, INITIAL ENCOUNTER: ICD-10-CM

## 2022-02-19 DIAGNOSIS — Z53.21 ELOPED FROM EMERGENCY DEPARTMENT: Primary | ICD-10-CM

## 2022-02-19 DIAGNOSIS — L08.9 FINGER INFECTION: ICD-10-CM

## 2022-02-19 PROCEDURE — 99283 EMERGENCY DEPT VISIT LOW MDM: CPT

## 2022-02-19 PROCEDURE — 73130 X-RAY EXAM OF HAND: CPT

## 2022-02-19 PROCEDURE — 83605 ASSAY OF LACTIC ACID: CPT

## 2022-02-19 PROCEDURE — 87040 BLOOD CULTURE FOR BACTERIA: CPT

## 2022-02-19 RX ORDER — KETOROLAC TROMETHAMINE 30 MG/ML
30 INJECTION, SOLUTION INTRAMUSCULAR; INTRAVENOUS ONCE
Status: DISCONTINUED | OUTPATIENT
Start: 2022-02-19 | End: 2022-02-20 | Stop reason: HOSPADM

## 2022-02-19 RX ORDER — SULFAMETHOXAZOLE AND TRIMETHOPRIM 800; 160 MG/1; MG/1
1 TABLET ORAL 2 TIMES DAILY
Qty: 20 TABLET | Refills: 0 | Status: SHIPPED | OUTPATIENT
Start: 2022-02-19 | End: 2022-03-01

## 2022-02-19 RX ORDER — CEPHALEXIN 500 MG/1
500 CAPSULE ORAL 4 TIMES DAILY
Qty: 40 CAPSULE | Refills: 0 | Status: SHIPPED | OUTPATIENT
Start: 2022-02-19 | End: 2022-03-01

## 2022-02-19 ASSESSMENT — ENCOUNTER SYMPTOMS
EYE DISCHARGE: 0
SHORTNESS OF BREATH: 0
NAUSEA: 0
COUGH: 0
SORE THROAT: 0
EYE PAIN: 0
ABDOMINAL PAIN: 0
RHINORRHEA: 0
BACK PAIN: 0
VOMITING: 0

## 2022-02-19 ASSESSMENT — PAIN DESCRIPTION - PROGRESSION: CLINICAL_PROGRESSION: GRADUALLY WORSENING

## 2022-02-19 ASSESSMENT — PAIN - FUNCTIONAL ASSESSMENT: PAIN_FUNCTIONAL_ASSESSMENT: 0-10

## 2022-02-19 ASSESSMENT — PAIN DESCRIPTION - PAIN TYPE
TYPE: ACUTE PAIN
TYPE: ACUTE PAIN

## 2022-02-19 ASSESSMENT — PAIN SCALES - GENERAL
PAINLEVEL_OUTOF10: 7
PAINLEVEL_OUTOF10: 7

## 2022-02-19 ASSESSMENT — PAIN DESCRIPTION - LOCATION
LOCATION: FINGER (COMMENT WHICH ONE)
LOCATION: FINGER (COMMENT WHICH ONE);OTHER (COMMENT)

## 2022-02-19 ASSESSMENT — PAIN DESCRIPTION - DESCRIPTORS
DESCRIPTORS: SHARP;CONSTANT;SHOOTING
DESCRIPTORS: SHARP;CONSTANT;SHOOTING

## 2022-02-19 ASSESSMENT — PAIN DESCRIPTION - FREQUENCY
FREQUENCY: CONTINUOUS
FREQUENCY: CONTINUOUS

## 2022-02-19 ASSESSMENT — PAIN DESCRIPTION - ORIENTATION
ORIENTATION: LEFT
ORIENTATION: LEFT

## 2022-02-19 ASSESSMENT — PAIN DESCRIPTION - ONSET: ONSET: SUDDEN

## 2022-02-20 NOTE — ED TRIAGE NOTES
Pt presents to emergency department via POV and is with a male, unknown relation. ABCs are intact. Pt is extremely hyperactive and states that she fell two days ago in the shower. Pt's left middle finger is extremely swollen and purple in color. Pt reports that she took ibuprofen for the pain approximately eight hours ago. Pt states she is a recovering drug addict. She stated her pain was at a 6 or 7 out of ten and then stated \"well make it a 3 because I am a recovering addict and don't need anything other than ibuprofen. \" Pt stated that she looked up her condition online and that \"I have a nail bed infection, can you just give me some Bactrim so I can go home. \" Pt had vital signs obtained and seated in the waiting room. Pt bought a drink and food from the vending machine and was advised to not eat or drink anything due to potential procedures. Pt became agitated and rude when informed of this. Pt was taken to ED-19 for further assessments and treatment.      Faustino Alberto, YANETHP

## 2022-02-20 NOTE — ED PROVIDER NOTES
[address] ENCOUNTER      Pt Name: Shlomo Verdin  MRN: [id number]  [place] 1995  Date of evaluation: 2/19/2022  Provider: Michelle Preston MD    CHIEF COMPLAINT       Chief Complaint   Patient presents with    Hand Injury     left hand with swelling    Fall     In shower x 2 days ago         [facility]      Shlomo Verdin is a 31 y.o. female who presents to the emergency department  for   Chief Complaint   Patient presents with    Hand Injury     left hand with swelling    Fall     In shower x 2 days ago       50-year-old female presents with pain and swelling to left third finger. She is left-hand dominant. She reports that 2 days ago she fell in the shower. She denies that she had very significant injury to her left hand. She does endorse striking part of her nail on the third finger of the left hand. Since that fall, she has had some progressive redness and swelling in the left third finger. She endorses difficulty moving the finger. Denies any fever or chills. She does have a history of illicit drug. She states she is not currently using any IV drugs. No other remarkable symptoms. No cough or shortness of breath. No abdominal pain. No chest pain. GCS of 15 in the emergency department. She is grossly moving all extremities spontaneously. Nursing Notes, Triage Notes & Vital Signs were reviewed. REVIEW OF SYSTEMS    (2-9 systems for level 4, 10 or more for level 5)     Review of Systems   Constitutional: Negative for chills and fever. HENT: Negative for congestion, rhinorrhea and sore throat. Eyes: Negative for pain and discharge. Respiratory: Negative for cough and shortness of breath. Cardiovascular: Negative for chest pain and palpitations. Gastrointestinal: Negative for abdominal pain, nausea and vomiting.    Endocrine: Negative for polydipsia and polyuria. Genitourinary: Negative for dysuria and flank pain. Musculoskeletal: Negative for back pain and neck pain. Left 3rd finger pain and swelling   Skin: Positive for wound. Negative for pallor. Neurological: Negative for dizziness, facial asymmetry, light-headedness and headaches. Psychiatric/Behavioral: Negative for confusion. Except as noted above the remainder of the review of systems was reviewed and negative. PAST MEDICAL HISTORY     Past Medical History:   Diagnosis Date    Anxiety     Drug abuse ([facility]     Hepatitis C        Prior to Admission medications    Medication Sig Start Date End Date Taking?  Authorizing Provider   cephALEXin (KEFLEX) 500 MG capsule Take 1 capsule by mouth 4 times daily for 10 days 2/19/22 3/1/22 Yes Marcelino Gonzalez MD   sulfamethoxazole-trimethoprim (BACTRIM DS) 800-160 MG per tablet Take 1 tablet by mouth 2 times daily for 10 days 2/19/22 3/1/22 Yes Marcelino Gonzalez MD   busPIRone (BUSPAR) 10 MG tablet Take 10 mg by mouth 2 times daily    Historical Provider, MD   QUEtiapine (SEROQUEL) 100 MG tablet Take 100 mg by mouth at bedtime    Historical Provider, MD   DULoxetine (CYMBALTA) 60 MG extended release capsule Take 60 mg by mouth daily    Historical Provider, MD   ibuprofen (ADVIL;MOTRIN) 600 MG tablet Take 1 tablet by mouth every 6 hours as needed for Pain 16   Matilda Cervantes PA-C        Patient Active Problem List   Diagnosis    Tylenol overdose    Opioid abuse with intoxication ([facility]    Depression with anxiety         SURGICAL HISTORY       Past Surgical History:   Procedure Laterality Date    ADENOIDECTOMY      INDUCED       TONSILLECTOMY           CURRENT MEDICATIONS       Discharge Medication List as of 2022 10:31 PM      CONTINUE these medications which have NOT CHANGED    Details   busPIRone (BUSPAR) 10 MG tablet Take 10 mg by mouth 2 times dailyHistorical Med      QUEtiapine (SEROQUEL) 100 MG tablet Take 100 mg by mouth at bedtimeHistorical Med      DULoxetine (CYMBALTA) 60 MG extended release capsule Take 60 mg by mouth dailyHistorical Med      ibuprofen (ADVIL;MOTRIN) 600 MG tablet Take 1 tablet by mouth every 6 hours as needed for Pain, Disp-30 tablet, R-0Print             ALLERGIES     Patient has no known allergies. FAMILY HISTORY       Family History   Problem Relation Age of Onset    Diabetes Father           SOCIAL HISTORY       Social History     Socioeconomic History    Marital status: Single     Spouse name: None    Number of children: None    Years of education: None    Highest education level: None   Occupational History    None   Tobacco Use    Smoking status: Current Every Day Smoker     Packs/day: 0.25     Years: 4.00     Pack years: 1.00     Types: Cigarettes    Smokeless tobacco: Never Used   Vaping Use    Vaping Use: Unknown   Substance and Sexual Activity    Alcohol use: Yes     Comment: rarely- once per month    Drug use: Yes     Types: Marijuana Jade Avelar     Comment: narcotics    Sexual activity: Yes     Partners: Female, Male   Other Topics Concern    None   Social History Narrative    None     Social Determinants of Health     Financial Resource Strain:     Difficulty of Paying Living Expenses: Not on file   Food Insecurity:     Worried About Running Out of Food in the Last Year: Not on file    Darryl of Food in the Last Year: Not on file   Transportation Needs:     Lack of Transportation (Medical): Not on file    Lack of Transportation (Non-Medical):  Not on file   Physical Activity:     Days of Exercise per Week: Not on file    Minutes of Exercise per Session: Not on file   Stress:     Feeling of Stress : Not on file   Social Connections:     Frequency of Communication with Friends and Family: Not on file    Frequency of Social Gatherings with Friends and Family: Not on file    Attends [employer] Services: Not on file   [employer] of [employer]s or Organizations: Not on file    Attends Club or Organization Meetings: Not on file    Marital Status: Not on file   Intimate Partner Violence:     Fear of Current or Ex-Partner: Not on file    Emotionally Abused: Not on file    Physically Abused: Not on file    Sexually Abused: Not on file   Housing Stability:     Unable to Pay for Housing in the Last Year: Not on file    Number of [place] in the Last Year: Not on file    Unstable Housing in the Last Year: Not on file       SCREENINGS               PHYSICAL EXAM    (up to 7 for level 4, 8 or more for level 5)     ED Triage Vitals [02/19/22 2113]   BP Temp Temp Source Pulse Resp SpO2 Height Weight   (!) 147/94 98 °F (36.7 °C) Oral 132 24 100 % 5' 7\" (1.702 m) 165 lb (74.8 kg)       Physical Exam  Vitals reviewed. Constitutional:       Appearance: She is not ill-appearing or toxic-appearing. HENT:      Head: Normocephalic and atraumatic. Nose: No congestion or rhinorrhea. Mouth/Throat:      Mouth: Mucous membranes are moist.      Pharynx: No oropharyngeal exudate or posterior oropharyngeal erythema. Eyes:      General:         Right eye: No discharge. Left eye: No discharge. Extraocular Movements: Extraocular movements intact. Pupils: Pupils are equal, round, and reactive to light. Cardiovascular:      Rate and Rhythm: Tachycardia present. Heart sounds: No friction rub. No gallop. Pulmonary:      Effort: Pulmonary effort is normal. No respiratory distress. Chest:      Chest wall: No tenderness. Abdominal:      Palpations: Abdomen is soft. Tenderness: There is no abdominal tenderness. There is no guarding. Musculoskeletal:         General: Swelling and tenderness present. Cervical back: Normal range of motion and neck supple. No tenderness.       Comments: Fusiform swelling in left 3rd finger; erythema; finger held mildly flexed; difficulty with flexion of finger  2+ radial pulses in LUE Lymphadenopathy:      Cervical: No cervical adenopathy. Skin:     General: Skin is warm. Capillary Refill: Capillary refill takes less than 2 seconds. Findings: Erythema present. Neurological:      General: No focal deficit present. Mental Status: She is alert and oriented to person, place, and time. DIAGNOSTIC RESULTS     Labs Reviewed   CULTURE, BLOOD 1   CULTURE, BLOOD 2   COMPREHENSIVE METABOLIC PANEL W/ REFLEX TO MG FOR LOW K   CBC WITH AUTO DIFFERENTIAL   LACTIC ACID   C-REACTIVE PROTEIN   SEDIMENTATION RATE        RADIOLOGY:     Non-plain film images such as CT, Ultrasound and MRI are read by the radiologist. Plain radiographic images are visualized and preliminarily interpreted by the emergency physician. Interpretation per the Radiologist below, if available at the time of this note:    XR HAND LEFT (MIN 3 VIEWS)   Final Result   Acute fracture involving the base of the distal phalanx of the 3rd digit. This is minimally displaced. Diffusely swollen 3rd digit, with no osseous destructive changes seen to   suggest osteomyelitis. No soft tissue gas is seen. ED BEDSIDE ULTRASOUND:   Performed by ED Physician Sara Chapa MD       LABS:  Labs Reviewed   CULTURE, BLOOD 1   CULTURE, BLOOD 2   COMPREHENSIVE METABOLIC PANEL W/ REFLEX TO MG FOR LOW K   CBC WITH AUTO DIFFERENTIAL   LACTIC ACID   C-REACTIVE PROTEIN   SEDIMENTATION RATE       All other labs were within normal range or not returned as of this dictation.     EMERGENCY DEPARTMENT COURSE and DIFFERENTIAL DIAGNOSIS/MDM:   Vitals:    Vitals:    02/19/22 2113 02/19/22 2122   BP: (!) 147/94 (!) 147/94   Pulse: 132 132   Resp: 24 24   Temp: 98 °F (36.7 °C) 98 °F (36.7 °C)   TempSrc: Oral Oral   SpO2: 100% 100%   Weight: 165 lb (74.8 kg) 165 lb (74.8 kg)   Height: 5' 7\" (1.702 m) 5' 7\" (1.702 m)           MDM  Number of Diagnoses or Management Options  Closed nondisplaced fracture of distal phalanx antibiotic prescriptions for her at her pharmacy. -  Patient seen and evaluated in the emergency department. -  Triage and nursing notes reviewed and incorporated. -  Old chart records reviewed and incorporated. -  Work-up included:  See above  -  Results discussed with patient. CONSULTS:  PHARMACY TO DOSE VANCOMYCIN    PROCEDURES:  None performed unless otherwise noted below     Procedures        FINAL IMPRESSION      1. Eloped from emergency department    2. Closed nondisplaced fracture of distal phalanx of left middle finger, initial encounter    3. Finger infection          DISPOSITION/PLAN   DISPOSITION Eloped - Left Before Treatment Complete 02/19/2022 10:30:59 PM      PATIENT REFERRED TO:  No follow-up provider specified. DISCHARGE MEDICATIONS:  Discharge Medication List as of 2/19/2022 10:31 PM          ED Provider Disposition Time  DISPOSITION Eloped - Left Before Treatment Complete 02/19/2022 10:30:59 PM      Appropriate personal protective equipment was worn during the patient's evaluation. These included surgical, eye protection, surgical mask or in 95 respirator and gloves. The patient was also placed in a surgical mask for the prevention of possible spread of respiratory viral illnesses. The Patient was instructed to read the package inserts with any medication that was prescribed. Major potential reactions and medication interactions were discussed. The Patient understands that there are numerous possible adverse reactions not covered. The patient was also instructed to arrange follow-up with his or her primary care provider for review of any pending labwork or incidental findings on any radiology results that were obtained. All efforts were made to discuss any incidental findings that require further monitoring. Controlled Substances Monitoring:     No flowsheet data found.     (Please note that portions of this note were completed with a voice recognition program. Efforts were made to edit the dictations but occasionally words are mis-transcribed.)    Keshawn Lira MD (electronically signed)  Attending Emergency Physician           Keshawn Lira MD  02/19/22 9808

## 2022-02-20 NOTE — ED NOTES
Pt arguing with visitor states \"Im not doing this. I dont want to stay here. How long is this going to take? \" Explained to pt that it will take several hours. Pt looks at her father ans says \"I dont Rollo Reining do this. \"  Father stands up and says \"If you WANT TO LOSE YOUR HAND GO ON AND LEAVE. \"   Pts father walked out. Pt tearful, takes off b/p cuff, pulse ox and gown then walks out of room.       Melinda Porter  02/19/22 9691

## 2022-03-28 ENCOUNTER — OFFICE VISIT (OUTPATIENT)
Dept: ORTHOPEDIC SURGERY | Age: 27
End: 2022-03-28
Payer: COMMERCIAL

## 2022-03-28 VITALS
RESPIRATION RATE: 16 BRPM | HEART RATE: 114 BPM | OXYGEN SATURATION: 99 % | HEIGHT: 67 IN | WEIGHT: 165 LBS | BODY MASS INDEX: 25.9 KG/M2

## 2022-03-28 DIAGNOSIS — S62.633A CLOSED DISPLACED FRACTURE OF DISTAL PHALANX OF LEFT MIDDLE FINGER, INITIAL ENCOUNTER: Primary | ICD-10-CM

## 2022-03-28 PROCEDURE — 99203 OFFICE O/P NEW LOW 30 MIN: CPT | Performed by: PHYSICIAN ASSISTANT

## 2022-03-28 PROCEDURE — G8419 CALC BMI OUT NRM PARAM NOF/U: HCPCS | Performed by: PHYSICIAN ASSISTANT

## 2022-03-28 PROCEDURE — 4004F PT TOBACCO SCREEN RCVD TLK: CPT | Performed by: PHYSICIAN ASSISTANT

## 2022-03-28 PROCEDURE — G8427 DOCREV CUR MEDS BY ELIG CLIN: HCPCS | Performed by: PHYSICIAN ASSISTANT

## 2022-03-28 PROCEDURE — G8484 FLU IMMUNIZE NO ADMIN: HCPCS | Performed by: PHYSICIAN ASSISTANT

## 2022-03-28 RX ORDER — DOXYCYCLINE HYCLATE 100 MG
100 TABLET ORAL 2 TIMES DAILY
Qty: 14 TABLET | Refills: 0 | Status: SHIPPED | OUTPATIENT
Start: 2022-03-28 | End: 2022-04-04

## 2022-03-28 NOTE — PROGRESS NOTES
Review of Systems   Constitutional: Negative. Negative for fever. HENT: Negative. Eyes: Negative. Respiratory: Negative. Cardiovascular: Negative. Gastrointestinal: Negative. Genitourinary: Negative. Musculoskeletal: Positive for arthralgias and myalgias. Skin: Negative. Neurological: Negative. Psychiatric/Behavioral: Negative. Wilbur Gray is a 31 y.o. female that presents to the office today with left hand middle finger pain. She states that she had an injury to this finger about a month ago jamming the finger. She states that she was seen initially in the ER at [facility] but then left and went to an ER at [facility]. She was treated for possible infection and cellulitis and actually spent several days in the hospital.  She never did follow-up with her orthopedic surgeon that saw her at the hospital.  She states that she was put on antibiotics that she has been done taking this for a while. She states that she has not reinjured the finger but it hurt more and more over the last several weeks and it looks deformed and swollen to her.     Past Medical History:   Diagnosis Date    Anxiety     Drug abuse ([facility] [address]     Hepatitis C        Past Surgical History:   Procedure Laterality Date    ADENOIDECTOMY      INDUCED   2010    TONSILLECTOMY         Family History   Problem Relation Age of Onset    Diabetes Father        Social History     Socioeconomic History    Marital status: Single     Spouse name: None    Number of children: None    Years of education: None    Highest education level: None   Occupational History    None   Tobacco Use    Smoking status: Current Every Day Smoker     Packs/day: 0.25     Years: 4.00     Pack years: 1.00     Types: Cigarettes    Smokeless tobacco: Never Used   Vaping Use    Vaping Use: Unknown   Substance and Sexual Activity    Alcohol use: Yes     Comment: rarely- once per month    Drug use: Yes     Types: Marijuana Ardia Rice)     Comment: narcotics    Sexual activity: Yes     Partners: Female, Male   Other Topics Concern    None   Social History Narrative    None     Social Determinants of Health     Financial Resource Strain:     Difficulty of Paying Living Expenses: Not on file   Food Insecurity:     Worried About Running Out of Food in the Last Year: Not on file    Darryl of Food in the Last Year: Not on file   Transportation Needs:     Lack of Transportation (Medical): Not on file    Lack of Transportation (Non-Medical):  Not on file   Physical Activity:     Days of Exercise per Week: Not on file    Minutes of Exercise per Session: Not on file   Stress:     Feeling of Stress : Not on file   Social Connections:     Frequency of Communication with Friends and Family: Not on file    Frequency of Social Gatherings with Friends and Family: Not on file    Attends Roman [employer] Services: Not on file    Active Member of [address] [place] [address] or Organizations: Not on file    Attends Club or Organization Meetings: Not on file    Marital Status: Not on file   Intimate Partner Violence:     Fear of Current or Ex-Partner: Not on file    Emotionally Abused: Not on file    Physically Abused: Not on file    Sexually Abused: Not on file   Housing Stability:     Unable to Pay for Housing in the Last Year: Not on file    Number of [place] in the Last Year: Not on file    Unstable Housing in the Last Year: Not on file       Current Outpatient Medications   Medication Sig Dispense Refill    doxycycline hyclate (VIBRA-TABS) 100 MG tablet Take 1 tablet by mouth 2 times daily for 7 days 14 tablet 0    busPIRone (BUSPAR) 10 MG tablet Take 10 mg by mouth 2 times daily      QUEtiapine (SEROQUEL) 100 MG tablet Take 100 mg by mouth at bedtime      DULoxetine (CYMBALTA) 60 MG extended release capsule Take 60 mg by mouth daily      ibuprofen (ADVIL;MOTRIN) 600 MG tablet Take 1 tablet by mouth every 6 hours as needed for Pain 30 tablet 0     No current facility-administered medications for this visit. No Known Allergies    Review of Systems:  See above      Physical Exam:   Pulse 114   Resp 16   Ht 5' 7\" (1.702 m)   Wt 165 lb (74.8 kg)   SpO2 99%   BMI 25.84 kg/m²        Gait is Normal.   Gen/Psych:Examination reveals a pleasant individual in no acute distress. The patient is oriented to time, place and person. The patient's mood and affect are appropriate. Patient appears well nourished. Body habitus is normal    Head: Head is atraumatic normocephalic,  ears are symmetric,     Eyes: Eyes show equal pupils bilaterally, extraocular muscles intact    Ears:  Hearing is intact to normal voice at 5 feet    Nose: Nares are patent bilaterally, no epistaxis,no rhinorrhea     Lymph:  No obvious lymphedema in bilateral upper extremities     Skin intact in bilateral upper extremities with no ulcerations, lesions, rash, erythema. Vascular: There are no varicosities in bilateral upper  extremities, sensation intact to light touch over bilateral upper extremities. Left hand: There is obvious deformity at the DIP joint of the left middle finger. The finger at the DIP joint is in a flexed position and has edema and mild erythema around it. Patient is unable to straighten the finger at the DIP joint. She does have pain with motion of the finger. Outsiderecord review: ER records    Imaging studies:  3 views of the left hand taken and reviewed in the office today show displaced fracture of the distal phalanx of the left middle digit with apparent periosteal reaction over the middle phalanx of the middle finger which is of unexplained origin. Impression:     Diagnosis Orders   1.  Closed displaced fracture of distal phalanx of left middle finger, initial encounter       (Possible infection)      Plan:    Patient is to return to her orthopedic surgeon that performed the procedure on her finger at [facility] as soon as possible preferably within the next several days. Was given an antibiotic today given the fact that the finger had some appearance of infection.

## 2022-03-28 NOTE — PATIENT INSTRUCTIONS
May take Ibuprofen or Motrin as needed  Rest, ice, and elevate as needed  Contact Christen Genera   [phone]

## 2022-03-31 ASSESSMENT — ENCOUNTER SYMPTOMS
EYES NEGATIVE: 1
GASTROINTESTINAL NEGATIVE: 1
RESPIRATORY NEGATIVE: 1

## 2022-06-03 ENCOUNTER — HOSPITAL ENCOUNTER (EMERGENCY)
Age: 27
Discharge: LEFT BEFORE DISCHARGE | End: 2022-06-03
Attending: EMERGENCY MEDICINE
Payer: COMMERCIAL

## 2022-06-03 VITALS
BODY MASS INDEX: 25.9 KG/M2 | RESPIRATION RATE: 16 BRPM | WEIGHT: 165 LBS | TEMPERATURE: 97.3 F | SYSTOLIC BLOOD PRESSURE: 112 MMHG | DIASTOLIC BLOOD PRESSURE: 78 MMHG | HEIGHT: 67 IN | HEART RATE: 89 BPM | OXYGEN SATURATION: 100 %

## 2022-06-03 DIAGNOSIS — T74.21XA SEXUAL ASSAULT OF ADULT, INITIAL ENCOUNTER: Primary | ICD-10-CM

## 2022-06-03 PROCEDURE — 99281 EMR DPT VST MAYX REQ PHY/QHP: CPT

## 2022-06-03 ASSESSMENT — PAIN - FUNCTIONAL ASSESSMENT: PAIN_FUNCTIONAL_ASSESSMENT: NONE - DENIES PAIN

## 2022-06-03 NOTE — ED NOTES
Maralee Litten from [employer] states will come to Ed to see patient     July Kee RN  06/03/22 2069
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
## 2022-06-03 NOTE — ED NOTES
[employer] contacted states will call this RN back        Breana Nunez [facility]  06/03/22 6703

## 2022-10-25 ENCOUNTER — HOSPITAL ENCOUNTER (EMERGENCY)
Age: 27
Discharge: HOME | End: 2022-10-25
Attending: EMERGENCY MEDICINE
Payer: COMMERCIAL

## 2022-10-25 VITALS
WEIGHT: 170 LBS | TEMPERATURE: 98 F | OXYGEN SATURATION: 100 % | HEIGHT: 67 IN | HEART RATE: 115 BPM | DIASTOLIC BLOOD PRESSURE: 87 MMHG | BODY MASS INDEX: 26.68 KG/M2 | SYSTOLIC BLOOD PRESSURE: 139 MMHG | RESPIRATION RATE: 16 BRPM

## 2022-10-25 DIAGNOSIS — A59.9 TRICHOMONIASIS: ICD-10-CM

## 2022-10-25 DIAGNOSIS — Z03.818 ENCOUNTER FOR PATIENT CONCERN ABOUT EXPOSURE TO INFECTIOUS ORGANISM: Primary | ICD-10-CM

## 2022-10-25 LAB
BACTERIA: ABNORMAL /HPF
BILIRUBIN URINE: NEGATIVE MG/DL
BLOOD, URINE: ABNORMAL
CLARITY: CLEAR
COLOR: YELLOW
GLUCOSE, URINE: NEGATIVE MG/DL
INTERPRETATION: NORMAL
KETONES, URINE: NEGATIVE MG/DL
LEUKOCYTE ESTERASE, URINE: ABNORMAL
Lab: NORMAL
MUCUS: ABNORMAL HPF
NITRITE URINE, QUANTITATIVE: NEGATIVE
PH, URINE: 6.5 (ref 5–8)
PREGNANCY, URINE: NEGATIVE
PROTEIN UA: NEGATIVE MG/DL
RBC URINE: 11 /HPF (ref 0–6)
SPECIFIC GRAVITY UA: 1.02 (ref 1–1.03)
SPECIFIC GRAVITY, URINE: 1.02 (ref 1–1.03)
SPECIMEN: NORMAL
SQUAMOUS EPITHELIAL: 5 /HPF
TRICHOMONAS: ABNORMAL /HPF
UROBILINOGEN, URINE: 0.2 MG/DL (ref 0.2–1)
WBC UA: 31 /HPF (ref 0–5)
WET PREP: NORMAL

## 2022-10-25 PROCEDURE — 87591 N.GONORRHOEAE DNA AMP PROB: CPT

## 2022-10-25 PROCEDURE — 87491 CHLMYD TRACH DNA AMP PROBE: CPT

## 2022-10-25 PROCEDURE — 87210 SMEAR WET MOUNT SALINE/INK: CPT

## 2022-10-25 PROCEDURE — 6370000000 HC RX 637 (ALT 250 FOR IP): Performed by: EMERGENCY MEDICINE

## 2022-10-25 PROCEDURE — 81025 URINE PREGNANCY TEST: CPT

## 2022-10-25 PROCEDURE — 81001 URINALYSIS AUTO W/SCOPE: CPT

## 2022-10-25 PROCEDURE — 6360000002 HC RX W HCPCS: Performed by: EMERGENCY MEDICINE

## 2022-10-25 PROCEDURE — 96372 THER/PROPH/DIAG INJ SC/IM: CPT

## 2022-10-25 PROCEDURE — 99284 EMERGENCY DEPT VISIT MOD MDM: CPT

## 2022-10-25 RX ORDER — DOXYCYCLINE HYCLATE 100 MG
100 TABLET ORAL 2 TIMES DAILY
Qty: 20 TABLET | Refills: 0 | Status: SHIPPED | OUTPATIENT
Start: 2022-10-25 | End: 2022-10-31

## 2022-10-25 RX ORDER — METRONIDAZOLE 250 MG/1
2000 TABLET ORAL ONCE
Status: COMPLETED | OUTPATIENT
Start: 2022-10-25 | End: 2022-10-25

## 2022-10-25 RX ORDER — CEFTRIAXONE SODIUM 250 MG/1
250 INJECTION, POWDER, FOR SOLUTION INTRAMUSCULAR; INTRAVENOUS ONCE
Status: COMPLETED | OUTPATIENT
Start: 2022-10-25 | End: 2022-10-25

## 2022-10-25 RX ORDER — DOXYCYCLINE HYCLATE 100 MG
100 TABLET ORAL ONCE
Status: COMPLETED | OUTPATIENT
Start: 2022-10-25 | End: 2022-10-25

## 2022-10-25 RX ADMIN — METRONIDAZOLE 2000 MG: 250 TABLET ORAL at 20:33

## 2022-10-25 RX ADMIN — DOXYCYCLINE HYCLATE 100 MG: 100 TABLET, COATED ORAL at 20:33

## 2022-10-25 RX ADMIN — CEFTRIAXONE SODIUM 250 MG: 250 INJECTION, POWDER, FOR SOLUTION INTRAMUSCULAR; INTRAVENOUS at 20:33

## 2022-10-25 ASSESSMENT — PAIN - FUNCTIONAL ASSESSMENT: PAIN_FUNCTIONAL_ASSESSMENT: 0-10

## 2022-10-25 ASSESSMENT — PAIN SCALES - GENERAL: PAINLEVEL_OUTOF10: 4

## 2022-10-25 NOTE — Clinical Note
Kellen Walker was seen and treated in our emergency department on 10/25/2022. She may return to work on 10/26/2022. If you have any questions or concerns, please don't hesitate to call.       Juan Gurrola MD

## 2022-10-26 NOTE — ED NOTES
This RN chaperone for pelvic exam. Pt tolerated well. Swabs sent at this time.       Itzel Catherine RN  10/25/22 2020
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
## 2022-10-28 LAB
CHLAMYDIA TRACHOMATIS AMPLIFIED DET: NEGATIVE
N GONORRHOEAE AMPLIFIED DET: NEGATIVE

## 2022-10-31 ENCOUNTER — TELEPHONE (OUTPATIENT)
Dept: PHARMACY | Age: 27
End: 2022-10-31

## 2022-10-31 NOTE — TELEPHONE ENCOUNTER
Pharmacy Note  ED Culture Follow-up    Eloy Jimenez is a 28 y.o. female. Allergies: Patient has no known allergies. Labs:  Lab Results   Component Value Date    BUN 11 01/23/2014    CREATININE 0.9 01/23/2014    WBC 12.2 (H) 01/23/2014     CrCl cannot be calculated (Patient's most recent lab result is older than the maximum 30 days allowed. ). Current antimicrobials:   Doxycycline 100 mg by mouth twice daily    ASSESSMENT:  Micro results:   Genital culture negative for C. Trachomatis and N. Gonorrhoeae     PLAN:  Need for intervention: Yes  Discussed with: Dr. Kierra Silva treatment:    Informed patient of negative result and instructed patient to discontinue doxycycline. Patient response:   Called and spoke with patient. Called/sent in prescription to: Not applicable    Please call with any questions.  Ext. [phone]    Darryl Garcia [facility], PharmD 11:59 AM 10/31/2022

## 2022-10-31 NOTE — PROGRESS NOTES
Pharmacy Note  ED Culture Follow-up    Alexi Ferris is a 28 y.o. female. Allergies: Patient has no known allergies. Labs:  Lab Results   Component Value Date    BUN 11 01/23/2014    CREATININE 0.9 01/23/2014    WBC 12.2 (H) 01/23/2014     CrCl cannot be calculated (Patient's most recent lab result is older than the maximum 30 days allowed. ). Current antimicrobials:   Doxycycline 100 mg by mouth twice daily    ASSESSMENT:  Micro results:   Genital culture negative for C. Trachomatis and N. Gonorrhoeae     PLAN:  Need for intervention: Yes  Discussed with: Dr. Good Meeks treatment:    Informed patient of negative result and instructed patient to discontinue doxycycline. Patient response:   Called and spoke with patient. Called/sent in prescription to: Not applicable    Please call with any questions.  Ext. [phone]    Mazin Logan, [address] [place] [address], PharmD 11:59 AM 10/31/2022